# Patient Record
Sex: MALE | Race: WHITE | NOT HISPANIC OR LATINO | Employment: FULL TIME | URBAN - METROPOLITAN AREA
[De-identification: names, ages, dates, MRNs, and addresses within clinical notes are randomized per-mention and may not be internally consistent; named-entity substitution may affect disease eponyms.]

---

## 2025-01-28 ENCOUNTER — DOCUMENTATION (OUTPATIENT)
Facility: HOSPITAL | Age: 40
End: 2025-01-28

## 2025-01-28 NOTE — PROGRESS NOTES
Intake reviewed. Patient sent the following message: Thank you for your interest in living donation. As your intended recipient has received an overwhelming response of potential living donors we ask for your patience as we work through the list of candidates. We will be in touch when we would like you to start the donor [email or web address]